# Patient Record
Sex: MALE | Race: BLACK OR AFRICAN AMERICAN | Employment: FULL TIME | URBAN - METROPOLITAN AREA
[De-identification: names, ages, dates, MRNs, and addresses within clinical notes are randomized per-mention and may not be internally consistent; named-entity substitution may affect disease eponyms.]

---

## 2017-01-06 ENCOUNTER — HOSPITAL ENCOUNTER (EMERGENCY)
Facility: HOSPITAL | Age: 22
Discharge: HOME | End: 2017-01-06
Attending: PHYSICIAN ASSISTANT
Payer: COMMERCIAL

## 2017-01-06 VITALS
OXYGEN SATURATION: 100 % | SYSTOLIC BLOOD PRESSURE: 120 MMHG | TEMPERATURE: 98 F | RESPIRATION RATE: 18 BRPM | WEIGHT: 209 LBS | DIASTOLIC BLOOD PRESSURE: 62 MMHG | HEIGHT: 76 IN | BODY MASS INDEX: 25.45 KG/M2 | HEART RATE: 73 BPM

## 2017-01-06 DIAGNOSIS — L03.113 RIGHT FOREARM CELLULITIS: Primary | ICD-10-CM

## 2017-01-06 PROCEDURE — 99283 EMERGENCY DEPT VISIT LOW MDM: CPT

## 2017-01-06 RX ORDER — CEPHALEXIN 500 MG/1
500 CAPSULE ORAL 3 TIMES DAILY
Qty: 21 CAPSULE | Refills: 0 | Status: SHIPPED | OUTPATIENT
Start: 2017-01-06 | End: 2017-01-13

## 2017-01-06 NOTE — ED PROVIDER NOTES
Patient Seen in: [facility] AND [facility] Emergency Department    History   Patient presents with:  Bite (integumentary)    Stated Complaint:     HPI    19-year-old male presents with chief complaint of skin redness present at right forearm. Onset last night. tenderness to the chest wall. Respiratory: Respiratory effort was normal.  There is no rales, wheezes, or rhonchi. There is no stridor. Air entry is equal.  Cardiovascular: Regular rate and rhythm, no murmurs, gallops, rubs. Capillary refill is brisk. Refills: 0

## 2017-01-06 NOTE — ED NOTES
D/C INSTRUCTIONS AND PRESCRIPTIONS GIVEN TO PT. STATES VERBAL UNDERSTANDING. INSTRUCTED TO F/U WITH MD AS DIRECTED. HAS NO FURTHER QUESTIONS.

## 2017-05-31 PROCEDURE — 99283 EMERGENCY DEPT VISIT LOW MDM: CPT

## 2017-06-01 ENCOUNTER — HOSPITAL ENCOUNTER (EMERGENCY)
Facility: HOSPITAL | Age: 22
Discharge: HOME | End: 2017-06-01
Attending: EMERGENCY MEDICINE

## 2017-06-01 VITALS
HEART RATE: 78 BPM | SYSTOLIC BLOOD PRESSURE: 123 MMHG | DIASTOLIC BLOOD PRESSURE: 72 MMHG | TEMPERATURE: 98 F | RESPIRATION RATE: 14 BRPM | OXYGEN SATURATION: 99 %

## 2017-06-01 DIAGNOSIS — S05.00XA CORNEAL ABRASION, UNSPECIFIED LATERALITY, INITIAL ENCOUNTER: Primary | ICD-10-CM

## 2017-06-01 RX ORDER — CIPROFLOXACIN HYDROCHLORIDE 3.5 MG/ML
2 SOLUTION/ DROPS TOPICAL
Qty: 1 BOTTLE | Refills: 0 | Status: SHIPPED | OUTPATIENT
Start: 2017-06-01 | End: 2017-06-08

## 2017-06-01 NOTE — ED PROVIDER NOTES
Patient Seen in: [facility]    History   Patient presents with:   Eye Visual Problem (opthalmic)    Stated Complaint:     HPI    Healthy 42-year-old male who was at work last week using some chemicals when a powder came up on b rhythm and intact distal pulses. Pulmonary/Chest: Effort normal. No respiratory distress. Musculoskeletal: Normal range of motion. No edema or tenderness. Neurological: Alert and oriented to person, place, and time. Skin: Skin is warm and dry.

## 2017-10-10 ENCOUNTER — APPOINTMENT (OUTPATIENT)
Dept: LAB | Facility: HOSPITAL | Age: 22
End: 2017-10-10
Attending: FAMILY MEDICINE
Payer: COMMERCIAL

## 2017-10-10 ENCOUNTER — OFFICE VISIT (OUTPATIENT)
Dept: FAMILY MEDICINE CLINIC | Facility: CLINIC | Age: 22
End: 2017-10-10

## 2017-10-10 VITALS
TEMPERATURE: 98 F | RESPIRATION RATE: 18 BRPM | WEIGHT: 221 LBS | HEART RATE: 65 BPM | SYSTOLIC BLOOD PRESSURE: 108 MMHG | HEIGHT: 75 IN | BODY MASS INDEX: 27.48 KG/M2 | DIASTOLIC BLOOD PRESSURE: 67 MMHG

## 2017-10-10 DIAGNOSIS — Z02.5 ROUTINE SPORTS PHYSICAL EXAM: Primary | ICD-10-CM

## 2017-10-10 DIAGNOSIS — Z02.5 ROUTINE SPORTS PHYSICAL EXAM: ICD-10-CM

## 2017-10-10 PROCEDURE — 99212 OFFICE O/P EST SF 10 MIN: CPT | Performed by: FAMILY MEDICINE

## 2017-10-10 PROCEDURE — 36415 COLL VENOUS BLD VENIPUNCTURE: CPT

## 2017-10-10 PROCEDURE — 85660 RBC SICKLE CELL TEST: CPT

## 2017-10-10 NOTE — PROGRESS NOTES
HPI:    Patient ID: Ehsan Patel is a 25year old male. Patient presents for a sports physical for college. No acute problems or significant chronic medical history. Immunizations are up to date as per patient/ parent.  Patient plans on playing basketbal Follow up as needed. Sports form filled out for school. Will check sickle prep testing as requested by school.        Orders Placed This Encounter      Hgb Solubility (Sickle Cell)    Meds This Visit:  No prescriptions requested or ordered in this encounter

## 2017-10-11 ENCOUNTER — TELEPHONE (OUTPATIENT)
Dept: FAMILY MEDICINE CLINIC | Facility: CLINIC | Age: 22
End: 2017-10-11

## 2017-10-13 NOTE — TELEPHONE ENCOUNTER
Patient is calling again regarding his lab results. Patient also wants to know if the results are ready to  so he can his mom picked them up. His mom's name is [employer].

## 2017-10-13 NOTE — TELEPHONE ENCOUNTER
Contacted Lab and was informed test will be run today. Reviewed this information with pt. Pt states he needs the results today, advised pt to call back later today to find out if results available, pt agreed with advice given.

## 2017-10-13 NOTE — TELEPHONE ENCOUNTER
Results are still pending, mother walked into clinic asking for results. Mother would like note stating pt had test done and that results still have not come in yet. Note given to mother.

## 2017-10-17 NOTE — TELEPHONE ENCOUNTER
Viewed by Stephen Javier on 10/16/2017  5:46 PM   Written by Haylie Gomez MD on 10/16/2017  1:01 PM   Rubén Sullivan,     Here is the test results we discussed on the phone.      Dr. Anthony Harvey

## 2019-11-26 ENCOUNTER — OFFICE VISIT (OUTPATIENT)
Dept: FAMILY MEDICINE CLINIC | Facility: CLINIC | Age: 24
End: 2019-11-26

## 2019-11-26 VITALS
WEIGHT: 242 LBS | SYSTOLIC BLOOD PRESSURE: 109 MMHG | TEMPERATURE: 98 F | BODY MASS INDEX: 30.09 KG/M2 | HEIGHT: 75 IN | DIASTOLIC BLOOD PRESSURE: 66 MMHG | HEART RATE: 69 BPM

## 2019-11-26 DIAGNOSIS — M76.892 TENDINITIS OF LEFT HIP FLEXOR: Primary | ICD-10-CM

## 2019-11-26 PROCEDURE — 99213 OFFICE O/P EST LOW 20 MIN: CPT | Performed by: FAMILY MEDICINE

## 2020-05-13 ENCOUNTER — NURSE TRIAGE (OUTPATIENT)
Dept: FAMILY MEDICINE CLINIC | Facility: CLINIC | Age: 25
End: 2020-05-13

## 2020-05-13 NOTE — TELEPHONE ENCOUNTER
Action Requested: Summary for Provider     []  Critical Lab, Recommendations Needed  [] Need Additional Advice  []   FYI    []   Need Orders  [] Need Medications Sent to Pharmacy  []  Other     SUMMARY: Per protocol advised : OV within 3 days  Future Appoi

## 2020-05-14 ENCOUNTER — TELEPHONE (OUTPATIENT)
Dept: FAMILY MEDICINE CLINIC | Facility: CLINIC | Age: 25
End: 2020-05-14

## 2020-05-14 ENCOUNTER — HOSPITAL ENCOUNTER (OUTPATIENT)
Dept: GENERAL RADIOLOGY | Facility: HOSPITAL | Age: 25
Discharge: HOME | End: 2020-05-14
Attending: FAMILY MEDICINE
Payer: COMMERCIAL

## 2020-05-14 ENCOUNTER — OFFICE VISIT (OUTPATIENT)
Dept: FAMILY MEDICINE CLINIC | Facility: CLINIC | Age: 25
End: 2020-05-14
Payer: COMMERCIAL

## 2020-05-14 VITALS
SYSTOLIC BLOOD PRESSURE: 112 MMHG | TEMPERATURE: 99 F | DIASTOLIC BLOOD PRESSURE: 75 MMHG | BODY MASS INDEX: 30.71 KG/M2 | HEIGHT: 75 IN | HEART RATE: 109 BPM | WEIGHT: 247 LBS

## 2020-05-14 DIAGNOSIS — M25.551 RIGHT HIP PAIN: Primary | ICD-10-CM

## 2020-05-14 DIAGNOSIS — M25.551 RIGHT HIP PAIN: ICD-10-CM

## 2020-05-14 PROCEDURE — 73502 X-RAY EXAM HIP UNI 2-3 VIEWS: CPT | Performed by: FAMILY MEDICINE

## 2020-05-14 PROCEDURE — 99214 OFFICE O/P EST MOD 30 MIN: CPT | Performed by: FAMILY MEDICINE

## 2020-05-14 NOTE — TELEPHONE ENCOUNTER
Imaging department requesting a new order for patient. States order is missing in the description field (right hip) please advise. Patient is unable to schedule until correct order is sent.

## 2020-05-14 NOTE — PROGRESS NOTES
HPI:    Patient ID: Carlos Fontanez is a 27year old male. HPI  Patient with ongoing hip pain. Injured when he was playing ball one year ago and has never resolved. Started stretching everyday which I prescribed at last visit.  Helped for a while now worse

## 2020-05-15 NOTE — TELEPHONE ENCOUNTER
Called spoke with pt on regards to referral is in system with  Correct information, pt states had xray done yesterday 5/14/2020 notified pt will inform

## 2020-05-26 ENCOUNTER — TELEPHONE (OUTPATIENT)
Dept: FAMILY MEDICINE CLINIC | Facility: CLINIC | Age: 25
End: 2020-05-26

## 2020-05-26 NOTE — TELEPHONE ENCOUNTER
Patient calling with condition update saw Dr. Zoila Newman 5/14/20 for hip pain. Patient states Dr. Zoila Newman suggested patient may have a hernia. 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   Patient states had t

## 2020-05-27 NOTE — TELEPHONE ENCOUNTER
Patient calling again with right hip pain and per note below he didn't go to the ER due to the fact that he has a new baby and doesn't want to bring any germs home.  He injured his hip flexor about 5/6 years ago and did certain stretches at that time which

## 2020-05-28 ENCOUNTER — OFFICE VISIT (OUTPATIENT)
Dept: FAMILY MEDICINE CLINIC | Facility: CLINIC | Age: 25
End: 2020-05-28
Payer: COMMERCIAL

## 2020-05-28 VITALS
HEIGHT: 75 IN | BODY MASS INDEX: 30.46 KG/M2 | WEIGHT: 245 LBS | TEMPERATURE: 98 F | HEART RATE: 75 BPM | DIASTOLIC BLOOD PRESSURE: 68 MMHG | SYSTOLIC BLOOD PRESSURE: 106 MMHG

## 2020-05-28 DIAGNOSIS — R10.2 PELVIC PAIN: Primary | ICD-10-CM

## 2020-05-28 PROCEDURE — 99213 OFFICE O/P EST LOW 20 MIN: CPT | Performed by: FAMILY MEDICINE

## 2020-05-28 RX ORDER — DICLOFENAC SODIUM 75 MG/1
75 TABLET, DELAYED RELEASE ORAL 2 TIMES DAILY
Qty: 30 TABLET | Refills: 2 | Status: SHIPPED | OUTPATIENT
Start: 2020-05-28 | End: 2021-01-27

## 2020-05-28 NOTE — PROGRESS NOTES
HPI:    Patient ID: Emma Sood is a 27year old male.     HPI  Patient presents with:  Abdominal Pain: lower abdominal pain for 3 days, denies burning or frequent urination   Hip Pain: RT hip pain   Had been seen for a prior hip pain, now after pushing ca

## 2020-06-16 ENCOUNTER — OFFICE VISIT (OUTPATIENT)
Dept: ORTHOPEDICS CLINIC | Facility: CLINIC | Age: 25
End: 2020-06-16
Payer: COMMERCIAL

## 2020-06-16 DIAGNOSIS — M25.851 RIGHT HIP IMPINGEMENT SYNDROME: Primary | ICD-10-CM

## 2020-06-16 DIAGNOSIS — M25.551 CHRONIC RIGHT HIP PAIN: ICD-10-CM

## 2020-06-16 DIAGNOSIS — G89.29 CHRONIC RIGHT HIP PAIN: ICD-10-CM

## 2020-06-16 PROCEDURE — 99244 OFF/OP CNSLTJ NEW/EST MOD 40: CPT | Performed by: ORTHOPAEDIC SURGERY

## 2020-06-16 NOTE — H&P
NURSING INTAKE COMMENTS: Patient presents with:  Consult: C/o on/off right hip pain for more than 5 years. Recalls no recent injuries. He noticed for the first time the pain would radiate across his abdomen, and down the right leg.   Taken tylenol with sl incontinence  MUSCULOSKELETAL: no other musculoskeletal complaints other than in HPI  NEURO: no numbness or tingling, no weakness or balance disorder  PSYCHE: no depression or anxiety  HEMATOLOGIC: no hx of blood dyscrasia, no Hx DVT/PE  ENDOCRINE: no thyr

## 2020-07-15 ENCOUNTER — TELEPHONE (OUTPATIENT)
Dept: ORTHOPEDICS CLINIC | Facility: CLINIC | Age: 25
End: 2020-07-15

## 2020-07-15 DIAGNOSIS — M25.551 CHRONIC RIGHT HIP PAIN: ICD-10-CM

## 2020-07-15 DIAGNOSIS — M25.851 RIGHT HIP IMPINGEMENT SYNDROME: Primary | ICD-10-CM

## 2020-07-15 DIAGNOSIS — G89.29 CHRONIC RIGHT HIP PAIN: ICD-10-CM

## 2020-07-15 NOTE — TELEPHONE ENCOUNTER
Angie needs to clarify an MRI order sent by Teresa Tello. Patient is schedule 7/16 and the order may be incorrect.   Please advise

## 2020-07-15 NOTE — TELEPHONE ENCOUNTER
Order changed. Called [facility] and she is gone for the day- s/w Génesis Dale and informed him that order has been changed to arthrogram.  Stephanie Zayas- can you please check with insurance to make sure still ok?  Pt has appt on 7/16

## 2020-07-15 NOTE — TELEPHONE ENCOUNTER
S/w Angie from Radiology and she wanted to clarify - [facility] ordered MRI right hip w/ and w/o con, but she wants to know if he meant o order and arthrogram?   Please confirm?

## 2020-07-16 ENCOUNTER — HOSPITAL ENCOUNTER (OUTPATIENT)
Dept: MRI IMAGING | Age: 25
Discharge: HOME | End: 2020-07-16
Attending: PHYSICIAN ASSISTANT
Payer: COMMERCIAL

## 2020-07-16 DIAGNOSIS — M25.851 RIGHT HIP IMPINGEMENT SYNDROME: ICD-10-CM

## 2020-07-16 DIAGNOSIS — M25.551 CHRONIC RIGHT HIP PAIN: ICD-10-CM

## 2020-07-16 DIAGNOSIS — G89.29 CHRONIC RIGHT HIP PAIN: ICD-10-CM

## 2020-07-16 PROCEDURE — A9575 INJ GADOTERATE MEGLUMI 0.1ML: HCPCS | Performed by: PHYSICIAN ASSISTANT

## 2020-07-16 PROCEDURE — 73723 MRI JOINT LWR EXTR W/O&W/DYE: CPT | Performed by: PHYSICIAN ASSISTANT

## 2020-07-16 NOTE — TELEPHONE ENCOUNTER
Spoke with Laxmi at [facility] and informed her order has been corrected and placed and per [employer] authorization on file is still valid. Laxmi verbalizes understanding,no further questions at this time.

## 2020-07-30 ENCOUNTER — PATIENT MESSAGE (OUTPATIENT)
Dept: FAMILY MEDICINE CLINIC | Facility: CLINIC | Age: 25
End: 2020-07-30

## 2020-07-30 ENCOUNTER — VIRTUAL PHONE E/M (OUTPATIENT)
Dept: FAMILY MEDICINE CLINIC | Facility: CLINIC | Age: 25
End: 2020-07-30

## 2020-07-30 DIAGNOSIS — M24.151 DEGENERATIVE TEAR OF ACETABULAR LABRUM OF RIGHT HIP: Primary | ICD-10-CM

## 2020-07-30 PROCEDURE — 99212 OFFICE O/P EST SF 10 MIN: CPT | Performed by: FAMILY MEDICINE

## 2020-07-30 NOTE — PROGRESS NOTES
Virtual Telephone Check-In    [address] verbally consents to a Virtual/Telephone Check-In visit on 07/30/20. Patient has been referred to the [facility] website at [email or web address] to review the yearly Consent to Treat document.     Patient Zina Walton

## 2020-07-31 NOTE — TELEPHONE ENCOUNTER
From: Luis Miguel Luque  To: Shagufta Estrella DO  Sent: 7/30/2020 4:55 PM CDT  Subject: Test Results Question    Hey doctor, I just missed your call. I was wonder what are my options moving forward as far as my results from my MRI.  And what restrictions will

## 2020-08-05 ENCOUNTER — TELEPHONE (OUTPATIENT)
Dept: ORTHOPEDICS CLINIC | Facility: CLINIC | Age: 25
End: 2020-08-05

## 2020-08-05 NOTE — TELEPHONE ENCOUNTER
Per pt is needing follow up visit, but states can not wait until Aug 25. Wanting to know if can be seen sooner by someone else, states is really needing to be seen because needs note to be able to return to work.  Please advise

## 2020-08-06 NOTE — TELEPHONE ENCOUNTER
He does have labral tears in his hip. See if he wants to make appointment with Dr. Gypsy Cifuentes to see if he is a hip arthroscopy candidate. None of the findings preclude work unless he is climbing ladders.

## 2020-08-07 ENCOUNTER — TELEPHONE (OUTPATIENT)
Dept: ORTHOPEDICS CLINIC | Facility: CLINIC | Age: 25
End: 2020-08-07

## 2020-08-07 NOTE — TELEPHONE ENCOUNTER
Spoke to pt and informed pt that letter approved. Discussed letter with pt and pt okay with letter. Letter generated and pt will obtain letter via [address]

## 2020-08-07 NOTE — TELEPHONE ENCOUNTER
Patient asking for note for work to indicate that he was under his care and was referred to [address] Please send note today if possible to patients bandar coleman.   *Patient also wants to state that he has been in contact since Wednesday, but doctor cleopatra

## 2020-08-07 NOTE — TELEPHONE ENCOUNTER
Spoke to pt and informed him of Lauryn's message and Shashank's message. Gave pt phone # for Félix Samuel to schedule an appt. Pt has [employer] PPO and informed pt that Félix Samuel is out of [facility] ortho. Pt verbalized understanding.

## 2021-02-13 ENCOUNTER — LAB ENCOUNTER (OUTPATIENT)
Dept: LAB | Age: 26
End: 2021-02-13
Attending: ORTHOPAEDIC SURGERY
Payer: COMMERCIAL

## 2021-02-13 DIAGNOSIS — Z01.818 PREOPERATIVE TESTING: ICD-10-CM

## 2021-02-14 LAB — SARS-COV-2 RNA RESP QL NAA+PROBE: NOT DETECTED

## 2021-04-11 ENCOUNTER — LAB ENCOUNTER (OUTPATIENT)
Dept: LAB | Facility: HOSPITAL | Age: 26
End: 2021-04-11
Attending: ORTHOPAEDIC SURGERY
Payer: COMMERCIAL

## 2021-04-11 DIAGNOSIS — Z01.818 PRE-OP TESTING: ICD-10-CM

## 2021-04-13 ENCOUNTER — HOSPITAL ENCOUNTER (OUTPATIENT)
Facility: HOSPITAL | Age: 26
Setting detail: HOSPITAL OUTPATIENT SURGERY
Discharge: HOME | End: 2021-04-13
Attending: ORTHOPAEDIC SURGERY | Admitting: ORTHOPAEDIC SURGERY
Payer: COMMERCIAL

## 2021-04-13 ENCOUNTER — ANESTHESIA (OUTPATIENT)
Dept: SURGERY | Facility: HOSPITAL | Age: 26
End: 2021-04-13
Payer: COMMERCIAL

## 2021-04-13 ENCOUNTER — APPOINTMENT (OUTPATIENT)
Dept: GENERAL RADIOLOGY | Facility: HOSPITAL | Age: 26
End: 2021-04-13
Attending: ORTHOPAEDIC SURGERY
Payer: COMMERCIAL

## 2021-04-13 ENCOUNTER — ANESTHESIA EVENT (OUTPATIENT)
Dept: SURGERY | Facility: HOSPITAL | Age: 26
End: 2021-04-13
Payer: COMMERCIAL

## 2021-04-13 VITALS
BODY MASS INDEX: 29.64 KG/M2 | SYSTOLIC BLOOD PRESSURE: 135 MMHG | WEIGHT: 251 LBS | RESPIRATION RATE: 18 BRPM | HEART RATE: 63 BPM | DIASTOLIC BLOOD PRESSURE: 78 MMHG | HEIGHT: 77 IN | TEMPERATURE: 98 F | OXYGEN SATURATION: 98 %

## 2021-04-13 DIAGNOSIS — Z01.818 PRE-OP TESTING: Primary | ICD-10-CM

## 2021-04-13 DIAGNOSIS — M25.851 FEMOROACETABULAR IMPINGEMENT OF RIGHT HIP: ICD-10-CM

## 2021-04-13 PROCEDURE — 76000 FLUOROSCOPY <1 HR PHYS/QHP: CPT | Performed by: ORTHOPAEDIC SURGERY

## 2021-04-13 PROCEDURE — 0SQ94ZZ REPAIR RIGHT HIP JOINT, PERCUTANEOUS ENDOSCOPIC APPROACH: ICD-10-PCS | Performed by: ORTHOPAEDIC SURGERY

## 2021-04-13 RX ORDER — EPHEDRINE SULFATE 50 MG/ML
INJECTION INTRAVENOUS AS NEEDED
Status: DISCONTINUED | OUTPATIENT
Start: 2021-04-13 | End: 2021-04-13 | Stop reason: SURG

## 2021-04-13 RX ORDER — ONDANSETRON 2 MG/ML
4 INJECTION INTRAMUSCULAR; INTRAVENOUS EVERY 6 HOURS PRN
Status: DISCONTINUED | OUTPATIENT
Start: 2021-04-13 | End: 2021-04-13

## 2021-04-13 RX ORDER — MIDAZOLAM HYDROCHLORIDE 1 MG/ML
INJECTION INTRAMUSCULAR; INTRAVENOUS AS NEEDED
Status: DISCONTINUED | OUTPATIENT
Start: 2021-04-13 | End: 2021-04-13 | Stop reason: SURG

## 2021-04-13 RX ORDER — SODIUM CHLORIDE, SODIUM LACTATE, POTASSIUM CHLORIDE, CALCIUM CHLORIDE 600; 310; 30; 20 MG/100ML; MG/100ML; MG/100ML; MG/100ML
INJECTION, SOLUTION INTRAVENOUS CONTINUOUS
Status: DISCONTINUED | OUTPATIENT
Start: 2021-04-13 | End: 2021-04-13

## 2021-04-13 RX ORDER — BUPIVACAINE HYDROCHLORIDE AND EPINEPHRINE 2.5; 5 MG/ML; UG/ML
INJECTION, SOLUTION INFILTRATION; PERINEURAL AS NEEDED
Status: DISCONTINUED | OUTPATIENT
Start: 2021-04-13 | End: 2021-04-13 | Stop reason: HOSPADM

## 2021-04-13 RX ORDER — CEFAZOLIN SODIUM/WATER 2 G/20 ML
2 SYRINGE (ML) INTRAVENOUS ONCE
Status: COMPLETED | OUTPATIENT
Start: 2021-04-13 | End: 2021-04-13

## 2021-04-13 RX ORDER — DEXAMETHASONE SODIUM PHOSPHATE 4 MG/ML
VIAL (ML) INJECTION AS NEEDED
Status: DISCONTINUED | OUTPATIENT
Start: 2021-04-13 | End: 2021-04-13 | Stop reason: SURG

## 2021-04-13 RX ORDER — HYDROMORPHONE HYDROCHLORIDE 1 MG/ML
0.4 INJECTION, SOLUTION INTRAMUSCULAR; INTRAVENOUS; SUBCUTANEOUS EVERY 5 MIN PRN
Status: DISCONTINUED | OUTPATIENT
Start: 2021-04-13 | End: 2021-04-13

## 2021-04-13 RX ORDER — HYDROMORPHONE HYDROCHLORIDE 1 MG/ML
0.2 INJECTION, SOLUTION INTRAMUSCULAR; INTRAVENOUS; SUBCUTANEOUS EVERY 5 MIN PRN
Status: DISCONTINUED | OUTPATIENT
Start: 2021-04-13 | End: 2021-04-13

## 2021-04-13 RX ORDER — MORPHINE SULFATE 4 MG/ML
2 INJECTION, SOLUTION INTRAMUSCULAR; INTRAVENOUS EVERY 10 MIN PRN
Status: DISCONTINUED | OUTPATIENT
Start: 2021-04-13 | End: 2021-04-13

## 2021-04-13 RX ORDER — PHENYLEPHRINE HCL 10 MG/ML
VIAL (ML) INJECTION AS NEEDED
Status: DISCONTINUED | OUTPATIENT
Start: 2021-04-13 | End: 2021-04-13 | Stop reason: SURG

## 2021-04-13 RX ORDER — HYDROMORPHONE HYDROCHLORIDE 1 MG/ML
0.6 INJECTION, SOLUTION INTRAMUSCULAR; INTRAVENOUS; SUBCUTANEOUS EVERY 5 MIN PRN
Status: DISCONTINUED | OUTPATIENT
Start: 2021-04-13 | End: 2021-04-13

## 2021-04-13 RX ORDER — ONDANSETRON 2 MG/ML
4 INJECTION INTRAMUSCULAR; INTRAVENOUS ONCE AS NEEDED
Status: DISCONTINUED | OUTPATIENT
Start: 2021-04-13 | End: 2021-04-13

## 2021-04-13 RX ORDER — MORPHINE SULFATE 4 MG/ML
4 INJECTION, SOLUTION INTRAMUSCULAR; INTRAVENOUS EVERY 10 MIN PRN
Status: DISCONTINUED | OUTPATIENT
Start: 2021-04-13 | End: 2021-04-13

## 2021-04-13 RX ORDER — ACETAMINOPHEN 500 MG
1000 TABLET ORAL ONCE
Status: COMPLETED | OUTPATIENT
Start: 2021-04-13 | End: 2021-04-13

## 2021-04-13 RX ORDER — HALOPERIDOL 5 MG/ML
0.25 INJECTION INTRAMUSCULAR ONCE AS NEEDED
Status: DISCONTINUED | OUTPATIENT
Start: 2021-04-13 | End: 2021-04-13

## 2021-04-13 RX ORDER — MORPHINE SULFATE 10 MG/ML
6 INJECTION, SOLUTION INTRAMUSCULAR; INTRAVENOUS EVERY 10 MIN PRN
Status: DISCONTINUED | OUTPATIENT
Start: 2021-04-13 | End: 2021-04-13

## 2021-04-13 RX ORDER — ONDANSETRON 2 MG/ML
INJECTION INTRAMUSCULAR; INTRAVENOUS AS NEEDED
Status: DISCONTINUED | OUTPATIENT
Start: 2021-04-13 | End: 2021-04-13 | Stop reason: SURG

## 2021-04-13 RX ORDER — MAGNESIUM HYDROXIDE 1200 MG/15ML
LIQUID ORAL CONTINUOUS PRN
Status: COMPLETED | OUTPATIENT
Start: 2021-04-13 | End: 2021-04-13

## 2021-04-13 RX ORDER — LIDOCAINE HYDROCHLORIDE 10 MG/ML
INJECTION, SOLUTION EPIDURAL; INFILTRATION; INTRACAUDAL; PERINEURAL AS NEEDED
Status: DISCONTINUED | OUTPATIENT
Start: 2021-04-13 | End: 2021-04-13 | Stop reason: SURG

## 2021-04-13 RX ORDER — HYDROCODONE BITARTRATE AND ACETAMINOPHEN 5; 325 MG/1; MG/1
1 TABLET ORAL AS NEEDED
Status: COMPLETED | OUTPATIENT
Start: 2021-04-13 | End: 2021-04-13

## 2021-04-13 RX ORDER — HYDROCODONE BITARTRATE AND ACETAMINOPHEN 5; 325 MG/1; MG/1
2 TABLET ORAL AS NEEDED
Status: COMPLETED | OUTPATIENT
Start: 2021-04-13 | End: 2021-04-13

## 2021-04-13 RX ORDER — PROCHLORPERAZINE EDISYLATE 5 MG/ML
5 INJECTION INTRAMUSCULAR; INTRAVENOUS ONCE AS NEEDED
Status: DISCONTINUED | OUTPATIENT
Start: 2021-04-13 | End: 2021-04-13

## 2021-04-13 RX ORDER — INDOMETHACIN 75 MG/1
75 CAPSULE, EXTENDED RELEASE ORAL
Qty: 20 CAPSULE | Refills: 0 | Status: SHIPPED | OUTPATIENT
Start: 2021-04-13 | End: 2021-05-11 | Stop reason: ALTCHOICE

## 2021-04-13 RX ORDER — NALOXONE HYDROCHLORIDE 0.4 MG/ML
80 INJECTION, SOLUTION INTRAMUSCULAR; INTRAVENOUS; SUBCUTANEOUS AS NEEDED
Status: DISCONTINUED | OUTPATIENT
Start: 2021-04-13 | End: 2021-04-13

## 2021-04-13 RX ORDER — TRIAMCINOLONE ACETONIDE 40 MG/ML
INJECTION, SUSPENSION INTRA-ARTICULAR; INTRAMUSCULAR AS NEEDED
Status: DISCONTINUED | OUTPATIENT
Start: 2021-04-13 | End: 2021-04-13 | Stop reason: HOSPADM

## 2021-04-13 RX ORDER — HYDROCODONE BITARTRATE AND ACETAMINOPHEN 10; 325 MG/1; MG/1
1 TABLET ORAL EVERY 6 HOURS PRN
Qty: 28 TABLET | Refills: 0 | Status: SHIPPED | OUTPATIENT
Start: 2021-04-13 | End: 2021-05-11 | Stop reason: ALTCHOICE

## 2021-04-13 RX ADMIN — EPHEDRINE SULFATE 10 MG: 50 INJECTION INTRAVENOUS at 13:25:00

## 2021-04-13 RX ADMIN — CEFAZOLIN SODIUM/WATER 2 G: 2 G/20 ML SYRINGE (ML) INTRAVENOUS at 12:46:00

## 2021-04-13 RX ADMIN — ONDANSETRON 4 MG: 2 INJECTION INTRAMUSCULAR; INTRAVENOUS at 14:21:00

## 2021-04-13 RX ADMIN — LIDOCAINE HYDROCHLORIDE 25 MG: 10 INJECTION, SOLUTION EPIDURAL; INFILTRATION; INTRACAUDAL; PERINEURAL at 12:38:00

## 2021-04-13 RX ADMIN — PHENYLEPHRINE HCL 100 MCG: 10 MG/ML VIAL (ML) INJECTION at 13:12:00

## 2021-04-13 RX ADMIN — SODIUM CHLORIDE, SODIUM LACTATE, POTASSIUM CHLORIDE, CALCIUM CHLORIDE: 600; 310; 30; 20 INJECTION, SOLUTION INTRAVENOUS at 12:35:00

## 2021-04-13 RX ADMIN — SODIUM CHLORIDE, SODIUM LACTATE, POTASSIUM CHLORIDE, CALCIUM CHLORIDE: 600; 310; 30; 20 INJECTION, SOLUTION INTRAVENOUS at 14:34:00

## 2021-04-13 RX ADMIN — PHENYLEPHRINE HCL 100 MCG: 10 MG/ML VIAL (ML) INJECTION at 13:36:00

## 2021-04-13 RX ADMIN — DEXAMETHASONE SODIUM PHOSPHATE 8 MG: 4 MG/ML VIAL (ML) INJECTION at 12:42:00

## 2021-04-13 RX ADMIN — MIDAZOLAM HYDROCHLORIDE 2 MG: 1 INJECTION INTRAMUSCULAR; INTRAVENOUS at 12:35:00

## 2021-04-13 RX ADMIN — PHENYLEPHRINE HCL 100 MCG: 10 MG/ML VIAL (ML) INJECTION at 13:18:00

## 2021-04-13 NOTE — ANESTHESIA PREPROCEDURE EVALUATION
Anesthesia PreOp Note    HPI:     Jolie Hedrick is a 23year old male who presents for preoperative consultation requested by:  Annika Perkins MD    Date of Surgery: 4/13/2021    Procedure(s):  RIGHT  HIP ARTHROSCOPY, LABRAL REPAIR, AND CAM RESECTION Resource Strain:       Difficulty of Paying Living Expenses:   Food Insecurity:       Worried About [employer] in the Last Year:       Ran Out of Food in the Last Year:   Transportation Needs:       Lack of Transportation (Medical):       Lack of Comments: I have discussed the anesthetic plan, major risks and alternatives with the patient and answered all questions. The patient desires to proceed with surgery and anesthesia as planned.    Informed Consent Plan and Risks Discussed With:  Patient and

## 2021-04-13 NOTE — BRIEF OP NOTE
Pre-Operative Diagnosis: Femoroacetabular impingement of right hip [M25.851]     Post-Operative Diagnosis: Femoroacetabular impingement of right hip [M25.851]      Procedure Performed:   RIGHT  HIP ARTHROSCOPY, LABRAL REPAIR    Surgeon(s) and Role:     * T

## 2021-04-13 NOTE — ANESTHESIA POSTPROCEDURE EVALUATION
Patient: Kaylie Hurley    Procedure Summary     Date: 04/13/21 Room / Location: [address] [place] [address] MAIN OR 12 / [address] [place] [address] MAIN OR    Anesthesia Start: 0740 Anesthesia Stop: 3903    Procedure: RIGHT  HIP ARTHROSCOPY, LABRAL REPAIR (Right Hip) Diagnosis:       Femoroacetabular

## 2021-04-13 NOTE — ANESTHESIA PROCEDURE NOTES
Airway  Urgency: elective      General Information and Staff    Patient location during procedure: OR  Anesthesiologist: Merritt Davison MD  Performed: anesthesiologist     Indications and Patient Condition  Indications for airway management: anesthe

## 2021-04-13 NOTE — H&P
History & Physical Examination    Patient Name: Padma Gilmore  MRN: [id number]  CSN: [id number]  YOB: 1995    Diagnosis: right hip impingement, labral tear    Present Illness: 23yo male with worsening right hip pain, not responsive to conser that if any underlying arthritis is noted intraoperatively that pain may persist.  All of the patient's questions were answered and the patient expressed full understanding of my explanations and plan of care.       Des Matthews  4/13/2021  12:08 PM

## 2021-04-14 NOTE — OPERATIVE REPORT
[facility]    PATIENT'S NAME: Martha Collado   ATTENDING PHYSICIAN: Aleksandar Murcia MD   OPERATING PHYSICIAN: Aleksandar Murcia MD   PATIENT ACCOUNT#:   [id number]    LOCATION:  [address]  MEDICAL RECORD #:   [id number]       [facility] was met in the preoperative holding area and the correct site identified with his help. He was then brought back to the operative room. General anesthesia was administered, and he was transferred to the [facility] table.   He was brought down over a wel We then used fluoroscopy as well as dynamically looked at the hip while flexing it, internally rotating and adducting it. We saw no evidence of any CAM type impingement.   The capsulotomy was closed with 2-0 nonabsorbable suture using a penetrating suture

## 2021-05-11 ENCOUNTER — OFFICE VISIT (OUTPATIENT)
Dept: FAMILY MEDICINE CLINIC | Facility: CLINIC | Age: 26
End: 2021-05-11
Payer: COMMERCIAL

## 2021-05-11 VITALS
SYSTOLIC BLOOD PRESSURE: 103 MMHG | BODY MASS INDEX: 29.52 KG/M2 | WEIGHT: 250 LBS | HEIGHT: 77 IN | DIASTOLIC BLOOD PRESSURE: 65 MMHG | HEART RATE: 76 BPM

## 2021-05-11 DIAGNOSIS — K59.03 DRUG-INDUCED CONSTIPATION: Primary | ICD-10-CM

## 2021-05-11 PROCEDURE — 3074F SYST BP LT 130 MM HG: CPT | Performed by: FAMILY MEDICINE

## 2021-05-11 PROCEDURE — 3008F BODY MASS INDEX DOCD: CPT | Performed by: FAMILY MEDICINE

## 2021-05-11 PROCEDURE — 99213 OFFICE O/P EST LOW 20 MIN: CPT | Performed by: FAMILY MEDICINE

## 2021-05-11 PROCEDURE — 3078F DIAST BP <80 MM HG: CPT | Performed by: FAMILY MEDICINE

## 2022-04-22 ENCOUNTER — NURSE TRIAGE (OUTPATIENT)
Dept: FAMILY MEDICINE CLINIC | Facility: CLINIC | Age: 27
End: 2022-04-22

## 2022-04-22 ENCOUNTER — OFFICE VISIT (OUTPATIENT)
Dept: FAMILY MEDICINE CLINIC | Facility: CLINIC | Age: 27
End: 2022-04-22
Payer: COMMERCIAL

## 2022-04-22 VITALS
DIASTOLIC BLOOD PRESSURE: 69 MMHG | BODY MASS INDEX: 29.64 KG/M2 | SYSTOLIC BLOOD PRESSURE: 104 MMHG | HEIGHT: 77 IN | HEART RATE: 74 BPM | WEIGHT: 251 LBS

## 2022-04-22 DIAGNOSIS — S83.421A SPRAIN OF LATERAL COLLATERAL LIGAMENT OF RIGHT KNEE, INITIAL ENCOUNTER: Primary | ICD-10-CM

## 2022-04-22 PROCEDURE — 3008F BODY MASS INDEX DOCD: CPT | Performed by: FAMILY MEDICINE

## 2022-04-22 PROCEDURE — 99213 OFFICE O/P EST LOW 20 MIN: CPT | Performed by: FAMILY MEDICINE

## 2022-04-22 PROCEDURE — 3074F SYST BP LT 130 MM HG: CPT | Performed by: FAMILY MEDICINE

## 2022-04-22 PROCEDURE — 3078F DIAST BP <80 MM HG: CPT | Performed by: FAMILY MEDICINE

## 2022-04-22 NOTE — PROGRESS NOTES
Subjective:   Patient ID: Noelle Jade is a 27year old male. HPI  Patient presents with:  Knee Pain: RT knee pain, unable to bend, swelling,  pain inside knee and back of knee, was playing basketball on saturday,  took tylonol not helping, denies injury trama     History/Other:   Review of Systems   Constitutional: Negative. Musculoskeletal: Positive for arthralgias. Right lateral and posterior knee pain     No current outpatient medications on file. Allergies:No Known Allergies    Objective:   Physical Exam  Vitals reviewed. Musculoskeletal:      Right knee: Decreased range of motion. Tenderness present over the LCL. Assessment & Plan:   Sprain of lateral collateral ligament of right knee, initial encounter  (primary encounter diagnosis)    Demonstrated home exercises. Knee sleeve and ibuprofen recommended. No orders of the defined types were placed in this encounter.       Meds This Visit:  Requested Prescriptions      No prescriptions requested or ordered in this encounter       Imaging & Referrals:  None

## 2022-07-07 ENCOUNTER — HOSPITAL ENCOUNTER (OUTPATIENT)
Age: 27
Discharge: HOME | End: 2022-07-07
Payer: COMMERCIAL

## 2022-07-07 VITALS
DIASTOLIC BLOOD PRESSURE: 69 MMHG | HEART RATE: 90 BPM | OXYGEN SATURATION: 98 % | SYSTOLIC BLOOD PRESSURE: 131 MMHG | TEMPERATURE: 98 F | WEIGHT: 247 LBS | BODY MASS INDEX: 29 KG/M2 | RESPIRATION RATE: 16 BRPM

## 2022-07-07 DIAGNOSIS — U07.1 COVID-19: ICD-10-CM

## 2022-07-07 DIAGNOSIS — Z20.822 CONTACT WITH AND (SUSPECTED) EXPOSURE TO COVID-19: Primary | ICD-10-CM

## 2022-07-07 LAB — SARS-COV-2 RNA RESP QL NAA+PROBE: DETECTED

## 2022-07-07 PROCEDURE — U0002 COVID-19 LAB TEST NON-CDC: HCPCS | Performed by: PHYSICIAN ASSISTANT

## 2022-07-07 PROCEDURE — 99203 OFFICE O/P NEW LOW 30 MIN: CPT | Performed by: PHYSICIAN ASSISTANT

## 2023-03-17 ENCOUNTER — OFFICE VISIT (OUTPATIENT)
Dept: FAMILY MEDICINE CLINIC | Facility: CLINIC | Age: 28
End: 2023-03-17

## 2023-03-17 VITALS
SYSTOLIC BLOOD PRESSURE: 99 MMHG | OXYGEN SATURATION: 97 % | DIASTOLIC BLOOD PRESSURE: 63 MMHG | HEIGHT: 77 IN | HEART RATE: 69 BPM | BODY MASS INDEX: 29.16 KG/M2 | WEIGHT: 247 LBS

## 2023-03-17 DIAGNOSIS — M25.562 ACUTE PAIN OF LEFT KNEE: Primary | ICD-10-CM

## 2023-03-17 PROCEDURE — 3074F SYST BP LT 130 MM HG: CPT | Performed by: FAMILY MEDICINE

## 2023-03-17 PROCEDURE — 99213 OFFICE O/P EST LOW 20 MIN: CPT | Performed by: FAMILY MEDICINE

## 2023-03-17 PROCEDURE — 3078F DIAST BP <80 MM HG: CPT | Performed by: FAMILY MEDICINE

## 2023-03-17 PROCEDURE — 3008F BODY MASS INDEX DOCD: CPT | Performed by: FAMILY MEDICINE

## 2023-03-17 NOTE — PROGRESS NOTES
Subjective:   Patient ID: Génesis Downey is a 24year old male. HPI  Patient presents with:  Knee Pain: Pt presents with LT knee buckling, weakness,  hard time doing things,  started 1 wk ago    History/Other:   Review of Systems   Constitutional: Negative. Musculoskeletal: Positive for arthralgias. Left knee pain during a weight lifting squat     No current outpatient medications on file. Allergies:No Known Allergies    Objective:   Physical Exam  Vitals reviewed. Musculoskeletal:      Right knee: Normal.      Left knee: Normal.         Assessment & Plan:   Acute pain of left knee  (primary encounter diagnosis)    Normal exam with one episode of knee pain and giving out during a squat activity. Recommend adding extension exercises also. Monitor for return of pain and is giving out should occur again or swelling he agrees. No orders of the defined types were placed in this encounter.       Meds This Visit:  Requested Prescriptions      No prescriptions requested or ordered in this encounter       Imaging & Referrals:  None

## 2023-03-21 ENCOUNTER — NURSE TRIAGE (OUTPATIENT)
Dept: FAMILY MEDICINE CLINIC | Facility: CLINIC | Age: 28
End: 2023-03-21

## 2023-03-21 NOTE — TELEPHONE ENCOUNTER
Left a detailed message to cell (ok per BOYD) regarding note below. Advised to call back and speak with RN. Patient has read my chart message to call the office.

## 2023-03-22 NOTE — TELEPHONE ENCOUNTER
Would recommend rest for lifting/ gym for now and monitor symptoms. If any persistent pain or symptoms, should follow up with DR VIEIRA to evaluate prior to going back to workouts.

## 2023-09-12 ENCOUNTER — OFFICE VISIT (OUTPATIENT)
Dept: FAMILY MEDICINE CLINIC | Facility: CLINIC | Age: 28
End: 2023-09-12

## 2023-09-12 VITALS
SYSTOLIC BLOOD PRESSURE: 150 MMHG | DIASTOLIC BLOOD PRESSURE: 80 MMHG | HEIGHT: 77 IN | BODY MASS INDEX: 29 KG/M2 | OXYGEN SATURATION: 98 % | HEART RATE: 82 BPM

## 2023-09-12 DIAGNOSIS — S76.312A STRAIN OF LEFT HAMSTRING MUSCLE, INITIAL ENCOUNTER: Primary | ICD-10-CM

## 2023-09-12 PROCEDURE — 3077F SYST BP >= 140 MM HG: CPT | Performed by: FAMILY MEDICINE

## 2023-09-12 PROCEDURE — 3079F DIAST BP 80-89 MM HG: CPT | Performed by: FAMILY MEDICINE

## 2023-09-12 PROCEDURE — 3008F BODY MASS INDEX DOCD: CPT | Performed by: FAMILY MEDICINE

## 2023-09-12 PROCEDURE — 99213 OFFICE O/P EST LOW 20 MIN: CPT | Performed by: FAMILY MEDICINE

## 2023-09-12 RX ORDER — DICLOFENAC SODIUM 75 MG/1
75 TABLET, DELAYED RELEASE ORAL 2 TIMES DAILY
Qty: 30 TABLET | Refills: 1 | Status: SHIPPED | OUTPATIENT
Start: 2023-09-12

## 2023-09-12 RX ORDER — ACETAMINOPHEN AND CODEINE PHOSPHATE 300; 30 MG/1; MG/1
1 TABLET ORAL EVERY 6 HOURS PRN
COMMUNITY
Start: 2023-09-10 | End: 2023-09-12

## 2024-02-05 ENCOUNTER — OFFICE VISIT (OUTPATIENT)
Dept: FAMILY MEDICINE CLINIC | Facility: CLINIC | Age: 29
End: 2024-02-05
Payer: COMMERCIAL

## 2024-02-05 VITALS — OXYGEN SATURATION: 98 % | WEIGHT: 251 LBS | HEIGHT: 76 IN | HEART RATE: 65 BPM | BODY MASS INDEX: 30.56 KG/M2

## 2024-02-05 DIAGNOSIS — S76.911A MUSCLE STRAIN OF RIGHT THIGH, INITIAL ENCOUNTER: ICD-10-CM

## 2024-02-05 DIAGNOSIS — S89.81XA HYPEREXTENSION INJURY OF RIGHT KNEE, INITIAL ENCOUNTER: Primary | ICD-10-CM

## 2024-02-05 PROCEDURE — 99213 OFFICE O/P EST LOW 20 MIN: CPT | Performed by: FAMILY MEDICINE

## 2024-02-05 NOTE — PROGRESS NOTES
Subjective:   Rakesh Luque is a 28 year old male who presents for Pain (Right knee pain that started Jan 5 while playing basketball. Pt states he didn't do anything jumping he just step \"funny\". )   Was falling to the floor with a planted foot and extended knee.  Seen in urgent care.  X ray done.     History/Other:    Chief Complaint Reviewed and Verified  Nursing Notes Reviewed and   Verified  Tobacco Reviewed  Allergies Reviewed  Medications Reviewed    Problem List Reviewed  Medical History Reviewed  Surgical History   Reviewed  Family History Reviewed  Social History Reviewed         Tobacco:  He has never smoked tobacco.    Current Outpatient Medications   Medication Sig Dispense Refill    diclofenac 75 MG Oral Tab EC Take 1 tablet (75 mg total) by mouth 2 (two) times daily. 30 tablet 1         Review of Systems:  Review of Systems   Constitutional: Negative.    Musculoskeletal:  Positive for arthralgias.        Right knee pain   Neurological: Negative.          Objective:   Pulse 65   Ht 6' 4\" (1.93 m)   Wt 251 lb (113.9 kg)   SpO2 98%   BMI 30.55 kg/m²  Estimated body mass index is 30.55 kg/m² as calculated from the following:    Height as of this encounter: 6' 4\" (1.93 m).    Weight as of this encounter: 251 lb (113.9 kg).  Physical Exam  Vitals reviewed.   Musculoskeletal:      Right knee: Normal range of motion. No tenderness. No LCL laxity, MCL laxity, ACL laxity or PCL laxity.           Assessment & Plan:   1. Hyperextension injury of right knee, initial encounter (Primary)  2. Muscle strain of right thigh, initial encounter    Normal exam except tight tender hamstring.  Reviewed massage with roller and exercises.  Reviewed XR report.      No follow-ups on file.    Joshua Roberts DO, 2/5/2024, 12:35 PM

## 2024-04-22 ENCOUNTER — OFFICE VISIT (OUTPATIENT)
Dept: FAMILY MEDICINE CLINIC | Facility: CLINIC | Age: 29
End: 2024-04-22
Payer: COMMERCIAL

## 2024-04-22 ENCOUNTER — HOSPITAL ENCOUNTER (OUTPATIENT)
Dept: GENERAL RADIOLOGY | Age: 29
Discharge: HOME | End: 2024-04-22
Payer: COMMERCIAL

## 2024-04-22 VITALS
SYSTOLIC BLOOD PRESSURE: 105 MMHG | RESPIRATION RATE: 16 BRPM | TEMPERATURE: 98 F | HEART RATE: 70 BPM | BODY MASS INDEX: 30.69 KG/M2 | OXYGEN SATURATION: 97 % | DIASTOLIC BLOOD PRESSURE: 67 MMHG | HEIGHT: 76 IN | WEIGHT: 252 LBS

## 2024-04-22 DIAGNOSIS — M25.562 CHRONIC PAIN OF LEFT KNEE: ICD-10-CM

## 2024-04-22 DIAGNOSIS — M25.562 CHRONIC PAIN OF LEFT KNEE: Primary | ICD-10-CM

## 2024-04-22 DIAGNOSIS — G89.29 CHRONIC PAIN OF LEFT KNEE: ICD-10-CM

## 2024-04-22 DIAGNOSIS — G89.29 CHRONIC PAIN OF LEFT KNEE: Primary | ICD-10-CM

## 2024-04-22 PROCEDURE — 73562 X-RAY EXAM OF KNEE 3: CPT

## 2024-04-22 NOTE — PROGRESS NOTES
HPI:    Patient ID: Rakesh Luque is a 28 year old male.    HPI     Patient here in office with complaint of intermittent left knee pain, rated 4/10, started over 1 year ago (recently worsening).  Aggravated with hyperextension.  States pain started 1 year ago shortly after he was playing basketball and jumped down on left knee.  No alleviating factors.  Has tried stretching, foam roller, and strengthening exercises which helped initially with pain.        Review of Systems   Constitutional: Negative.    Respiratory: Negative.     Cardiovascular: Negative.    Musculoskeletal:         Left knee pain   Skin: Negative.    Neurological: Negative.    Psychiatric/Behavioral: Negative.              No current outpatient medications on file.     Allergies:No Known Allergies   /67   Pulse 70   Temp 98.2 °F (36.8 °C) (Temporal)   Resp 16   Ht 6' 4\" (1.93 m)   Wt 252 lb (114.3 kg)   SpO2 97%   BMI 30.67 kg/m²   Body mass index is 30.67 kg/m².  PHYSICAL EXAM:   Physical Exam  Vitals reviewed.   Constitutional:       General: He is not in acute distress.     Appearance: Normal appearance. He is not ill-appearing.   Pulmonary:      Effort: Pulmonary effort is normal. No respiratory distress.   Musculoskeletal:         General: Swelling and tenderness present. No signs of injury. Normal range of motion.      Comments: Right suprapatellar/popliteal fossa tenderness.  Negative varus/valgus stress test, Gail's   Skin:     General: Skin is warm.      Findings: No rash.   Neurological:      General: No focal deficit present.      Mental Status: He is alert and oriented to person, place, and time.   Psychiatric:         Mood and Affect: Mood normal.         Behavior: Behavior normal.         Thought Content: Thought content normal.         Judgment: Judgment normal.                ASSESSMENT/PLAN:   1. Chronic pain of left knee  -Take ibuprofen 400-600 mg every 6-8 hours as needed over-the-counter for pain  - Ace wrap  applied to left knee during office visit.  Wear during the day, remove at night  - X-ray ordered as listed below, will await results for further recommendations  - XR KNEE (3 VIEWS), LEFT (CPT=73562); Future      No orders of the defined types were placed in this encounter.      Meds This Visit:  Requested Prescriptions      No prescriptions requested or ordered in this encounter       Imaging & Referrals:  None         ID#[id number]

## 2024-04-29 ENCOUNTER — TELEPHONE (OUTPATIENT)
Dept: FAMILY MEDICINE CLINIC | Facility: CLINIC | Age: 29
End: 2024-04-29

## 2024-04-29 DIAGNOSIS — M25.562 CHRONIC PAIN OF LEFT KNEE: Primary | ICD-10-CM

## 2024-04-29 DIAGNOSIS — G89.29 CHRONIC PAIN OF LEFT KNEE: Primary | ICD-10-CM

## 2024-04-29 NOTE — TELEPHONE ENCOUNTER
I left detailed message on BOYD verified # [phone] made aware of JACOB Ortega's interpretation and recommendation - as well as contact info for referral placed; also making aware of [employer] message being sent with interpretation and recommendations. [1st attempt]    RN Triage - please check if patient read [employer] message, if not please make 2nd attempt to call

## 2024-04-29 NOTE — TELEPHONE ENCOUNTER
Orthopedic referral not appropriate.   Patient has no abnormality noted on x-ray.   I will place referral for physiatry/sports medicine.

## 2024-04-29 NOTE — TELEPHONE ENCOUNTER
ANNA KEITH: patient wants to see ORTHO instead.     Are you able to provide referral?    Patient states he did review xray results of knee. He prefers to see ORTHO instead of doing PT.

## 2024-04-30 NOTE — TELEPHONE ENCOUNTER
Left message on voicemail to check his phone message and mychart from yesterday and confirm he did get it.

## 2024-10-01 ENCOUNTER — OFFICE VISIT (OUTPATIENT)
Dept: FAMILY MEDICINE CLINIC | Facility: CLINIC | Age: 29
End: 2024-10-01
Payer: COMMERCIAL

## 2024-10-01 VITALS
OXYGEN SATURATION: 99 % | RESPIRATION RATE: 18 BRPM | WEIGHT: 245 LBS | HEART RATE: 76 BPM | BODY MASS INDEX: 30 KG/M2 | TEMPERATURE: 98 F | SYSTOLIC BLOOD PRESSURE: 126 MMHG | DIASTOLIC BLOOD PRESSURE: 78 MMHG

## 2024-10-01 DIAGNOSIS — M27.3 DRY TOOTH SOCKET: Primary | ICD-10-CM

## 2024-10-01 DIAGNOSIS — R51.9 ACUTE NONINTRACTABLE HEADACHE, UNSPECIFIED HEADACHE TYPE: ICD-10-CM

## 2024-10-01 PROCEDURE — 99213 OFFICE O/P EST LOW 20 MIN: CPT

## 2024-10-01 RX ORDER — AMOXICILLIN 500 MG/1
500 CAPSULE ORAL 3 TIMES DAILY
COMMUNITY
Start: 2024-09-27

## 2024-10-02 NOTE — PROGRESS NOTES
Subjective:   Patient ID: Rakesh Luque is a 28 year old male.    Pt presents to [facility] c/o severe headache and right sided neck pain. Of note, pt had 2/4 wisdom teeth extracted by oral surgeon on 9/23. Prior to extraction, was taking Motrin daily for pain associated with impacted lower tooth. Was prescribed Amoxicillin and Norco by oral surgeon. Did not start Amoxicillin Rx until 9/26 following the report of \"a ball\" that developed in his lower right cheek. Has not resumed taking any analgesics for the head and neck pain.    History/Other:   Review of Systems   Constitutional:  Positive for fatigue. Negative for fever.   HENT:  Positive for dental problem.    Gastrointestinal:  Negative for nausea.   Musculoskeletal:  Positive for neck pain. Negative for neck stiffness.   Neurological:  Positive for headaches.   All other systems reviewed and are negative.  Current Outpatient Medications   Medication Sig Dispense Refill    amoxicillin 500 MG Oral Cap Take 1 capsule (500 mg total) by mouth 3 (three) times daily.       Allergies:No Known Allergies    Objective:   Physical Exam  Vitals reviewed.   Constitutional:       Appearance: Normal appearance.   HENT:      Head: Normocephalic and atraumatic.      Jaw: Tenderness present.        Right Ear: Ear canal and external ear normal. There is impacted cerumen.      Left Ear: Ear canal and external ear normal. There is impacted cerumen.      Nose: Nose normal.      Mouth/Throat:      Mouth: Mucous membranes are moist.      Dentition: Abnormal dentition.      Pharynx: Oropharynx is clear. Uvula midline.        Comments: 3rd upper and lower right molars removed. Dry sockets noted.  Eyes:      Extraocular Movements: Extraocular movements intact.      Conjunctiva/sclera: Conjunctivae normal.      Pupils: Pupils are equal, round, and reactive to light.   Cardiovascular:      Rate and Rhythm: Normal rate and regular rhythm.      Pulses: Normal pulses.      Heart sounds: Normal  heart sounds.   Pulmonary:      Effort: Pulmonary effort is normal.      Breath sounds: Normal breath sounds.   Musculoskeletal:         General: Normal range of motion.      Cervical back: Normal range of motion and neck supple. Tenderness present.   Skin:     General: Skin is warm and dry.      Capillary Refill: Capillary refill takes less than 2 seconds.   Neurological:      General: No focal deficit present.      Mental Status: He is alert and oriented to person, place, and time.   Psychiatric:         Mood and Affect: Mood normal.         Behavior: Behavior is cooperative.     Assessment & Plan:     ICD-10-CM    1. Dry tooth socket  M27.3       2. Acute nonintractable headache, unspecified headache type  R51.9           Meds This Visit:  Finish Amoxicillin Rx from oral surgeon. Resume rotating Motrin 600mg Q4hrs and Tylenol 1000mg Q6hrs PRN for pain. Apply ice to right side jaw for pain. Consume soft diet, such as smoothies, soups and milk shakes. Avoid use of straw. If pain persists or fever develops, follow up with oral surgeon.      Imaging & Referrals:  None

## (undated) DEVICE — 60 ML SYRINGE LUER-LOCK TIP: Brand: MONOJECT

## (undated) DEVICE — STANDARD HYPODERMIC NEEDLE,ALUMINUM HUB: Brand: MONOJECT

## (undated) DEVICE — 5.5 MM LONG ABRADER SHAVER BLADES                                    AND BURRS, POWER/EP-1, 18 CM WORKING                                    LENGTH,BLACK,PACKAGED 3 PER BOX, STERILE

## (undated) DEVICE — 1.8MM Q-FIX DISPOSABLE FLEXIBLE DRILL: Brand: Q-FIX

## (undated) DEVICE — GOWN SURG AERO BLUE PERF XLG

## (undated) DEVICE — HIP PINNING: Brand: MEDLINE INDUSTRIES, INC.

## (undated) DEVICE — TUBING IRR 16FT CNT WV 3 ASCP

## (undated) DEVICE — COVER STND 54X23IN MAYO REINF

## (undated) DEVICE — GAMMEX® PI HYBRID SIZE 8, STERILE POWDER-FREE SURGICAL GLOVE, POLYISOPRENE AND NEOPRENE BLEND: Brand: GAMMEX

## (undated) DEVICE — AMBIENT HIPVAC 50 IFS: Brand: AMBIENT

## (undated) DEVICE — SOL  .9 3000ML

## (undated) DEVICE — INSTRUMENT ORTHOPEDIC HIP

## (undated) DEVICE — CHLORAPREP 26ML APPLICATOR

## (undated) DEVICE — DRAPE SHEET LG

## (undated) DEVICE — NEEDLE HPO 18GA 1.5IN ECLPS

## (undated) DEVICE — 3 ML SYRINGE LUER-LOCK TIP: Brand: MONOJECT

## (undated) DEVICE — 4.5 MM CURVED INCISOR PLUS ELITE                                    LONG SHAVER BLADES AND BURRS,                                    POWER/EP-1, SLATE, 18 CM WORKING                                    LENGTH, PACKAGED 3 PER BOX, STERILE: Brand: DYONICS INCISOR ELITE

## (undated) DEVICE — BANDAGE ROLL,100% COTTON, 6 PLY, LARGE: Brand: KERLIX

## (undated) DEVICE — MEDI-VAC NON-CONDUCTIVE SUCTION TUBING: Brand: CARDINAL HEALTH

## (undated) DEVICE — CLEAR-TRAC COMPLETE HIP CANNULA                                    8.5 MM X 110 MM: Brand: CLEAR-TRAC

## (undated) DEVICE — PERINEAL POST PAD 1

## (undated) DEVICE — DISPOSABLE HIB PAC INCLUDES 3                                    GUIDEWIRES AND 2 ARTHROSCOPY NEEDLES

## (undated) DEVICE — CONTAINER SPEC STR 4OZ GRY LID

## (undated) DEVICE — 3M™ TEGADERM™ TRANSPARENT FILM DRESSING, 1626W, 4 IN X 4-3/4 IN (10 CM X 12 CM), 50 EACH/CARTON, 4 CARTON/CASE: Brand: 3M™ TEGADERM™

## (undated) DEVICE — SUTURE ETHILON 3-0 669H

## (undated) DEVICE — ACCU-PASS DIRECT CRESCENT XL: Brand: ACCU-PASS

## (undated) NOTE — LETTER
10/13/2017          To Whom It May Concern:    [facility] had sickle cell blood test done 10/10/17, results are still pending at the lab. Once results come in, Dr. Laura Pantoja will sign off. If you require additional information please contact our office.

## (undated) NOTE — ED AVS SNAPSHOT
[facility] Emergency Department    [place] [address]    Phone:  [phone]    Fax:  [phone]           Emma Sood   MRN: [id number]    Department:  [facility] Emergency Department   Date of Visit:  5/31/20 covered by your plan. Please contact your insurance company to determine coverage and benefits available for follow-up care and referrals.       If you have difficulty scheduling your follow-up appointment as directed, please call our  at ([phone]) If you believe that any of the medications or instructions on this list is different from what your Primary Care doctor has instructed you - please continue to take your medications as instructed by your Primary Care doctor until you can check with your do coverage. Patient Yumi Bahena is a Federal Navigator program that can help with your Affordable Care Act coverage, as well as all types of Medicaid plans.   To get signed up and covered, please call [phone] and ask to get set up for an insuran

## (undated) NOTE — ED AVS SNAPSHOT
[facility] Emergency Department    [place] [address]     [place] [address]    Phone:  [phone]    Fax:  [phone]           Natalya Calixto   MRN: [id number]    Department:  [facility] Emergency Department   Date of Visit:  5/31/20 and Class Registration line at [phone] or find a doctor online by visiting [email or web address].    IF THERE IS ANY CHANGE OR WORSENING OF YOUR CONDITION, CALL YOUR PRIMARY CARE PHYSICIAN AT ONCE OR RETURN IMMEDIATELY TO [address] [place] [address].     If

## (undated) NOTE — ED AVS SNAPSHOT
[facility] Emergency Department    [place] [address]     [place] [address]    Phone:  [phone]    Fax:  [phone]           Stephen Javier   MRN: [id number]    Department:  [facility] Emergency Department   Date of Visit:  1/6/201 and Class Registration line at [phone] or find a doctor online by visiting [email or web address].    IF THERE IS ANY CHANGE OR WORSENING OF YOUR CONDITION, CALL YOUR PRIMARY CARE PHYSICIAN AT ONCE OR RETURN IMMEDIATELY TO [address] [place] [address].     If

## (undated) NOTE — LETTER
6/16/2020              Rakesh Luque        [address]        [place] [id number]         To Whom It May Concern,    Conner Goddard is currently under my medical care. Renata Morgan may return to work with restrictions.   Restrictions are no pushing, pulling

## (undated) NOTE — LETTER
8/7/2020          To Whom It May Concern:    Kanu Parkinson is currently under my medical care. I have referred him to Dr. Hai Porter. Lee Salazar has been in contact with my office since Wednesday, but I have been on vacation.      If you require additi

## (undated) NOTE — LETTER
Date & Time: 7/7/2022, 6:27 PM  Patient: Beena Petite Rushing  Encounter Provider(s):    Wolf Hernandez PA-C       To Whom It May Concern:    Narciso Win was seen and treated in our department on 7/7/2022. COVID-19 precautions; minimum 5-day quarantine from day of onset. If minimally symptomatic, 5 further days of mask usage.   If moderately symptomatic, 5 further days of strict quarantine    If you have any questions or concerns, please do not hesitate to call.        _____________________________  Physician/APC Signature

## (undated) NOTE — LETTER
To Whom It May Concern:    Nubia Padgett has been under our care regarding ongoing medical issues. Because of this, he has been required to restrict her physical activities.     He may resume his usual activities, including work, on 5/29/2020 with the foll

## (undated) NOTE — ED AVS SNAPSHOT
[facility] Emergency Department    [address]     [place] [address]    Phone:  [phone]    Fax:  [phone]           Hortensia Akers   MRN: [id number]    Department:  [facility] Emergency Department   Date of Visit:  1/6/201 If you have difficulty scheduling your follow-up appointment as directed, please call our  at [phone]. Si tiene problemas para programar polly janes de seguimiento según lo indicado, llame al encargado de jason al [phone].     It i continue to take your medications as instructed by your Primary Care doctor until you can check with your doctor. Please bring the medication list to your next doctor's appointment.     Any imaging studies and labs completed today can be reviewed in your M Call the [employer] for assistance with your inactive [employer] account    If you have questions, you can call [phone] to talk to our [place] Staff. Remember, [employer] is NOT to be used for urgent needs. For medical emergencies, dial 911.     Vi